# Patient Record
(demographics unavailable — no encounter records)

---

## 2025-03-04 NOTE — PHYSICAL EXAM
[No Edema] : there was no peripheral edema [Normal] : no rash [Coordination Grossly Intact] : coordination grossly intact [No Focal Deficits] : no focal deficits [Normal Gait] : normal gait [Normal Affect] : the affect was normal [Normal Insight/Judgement] : insight and judgment were intact [de-identified] : has superficial abrasions on the forearms from his puppies.

## 2025-03-04 NOTE — HEALTH RISK ASSESSMENT
[No] : In the past 12 months have you used drugs other than those required for medical reasons? No [No falls in past year] : Patient reported no falls in the past year [0] : 2) Feeling down, depressed, or hopeless: Not at all (0) [PHQ-2 Negative - No further assessment needed] : PHQ-2 Negative - No further assessment needed [Former] : Former [20 or more] : 20 or more [< 15 Years] : < 15 Years [HIV test declined] : HIV test declined [Hepatitis C test declined] : Hepatitis C test declined [None] : None [With Significant Other] : lives with significant other [Employed] : employed [Significant Other] : lives with significant other [Feels Safe at Home] : Feels safe at home [Fully functional (bathing, dressing, toileting, transferring, walking, feeding)] : Fully functional (bathing, dressing, toileting, transferring, walking, feeding) [Fully functional (using the telephone, shopping, preparing meals, housekeeping, doing laundry, using] : Fully functional and needs no help or supervision to perform IADLs (using the telephone, shopping, preparing meals, housekeeping, doing laundry, using transportation, managing medications and managing finances) [With Patient/Caregiver] : , with patient/caregiver [Reviewed no changes] : Reviewed, no changes [I will adhere to the patient's wishes.] : I will adhere to the patient's wishes. [de-identified] : sober x5 years [Audit-CScore] : 0 [de-identified] : has increased activity recently with new puppies [de-identified] : room for improvement; admits to carbs/sugar [[id number]] : 0 [EyeExamDate] : 2024 [Patient declined colonoscopy] : Patient declined colonoscopy [Change in mental status noted] : No change in mental status noted [Language] : denies difficulty with language [Sexually Active] : not sexually active [High Risk Behavior] : no high risk behavior

## 2025-03-04 NOTE — ASSESSMENT
[FreeTextEntry1] : Patient is a 62yo male with PMH HTN, HLD, prediabetes, overweight who presents to the office for CPE.  Health Maintenance - Due for CRC screening, declines colonoscopy for now but willing to consider Cologuard.  Tasked  to set patient up. - Fasting labs drawn in office. - EKG performed in office NSR, no acute ST/T changes, no arrhythmias.  Pt denies cardiac complaints.  Pt has strong family history of heart disease and has several heart disease risk factors.  Has been recommended to see Cardiology for baseline testing in the past but has not followed through.  Referral placed. - Eat plenty of fruits and vegetables, especially deeply colored fruits/vegetables (such as leafy greens, peaches) that are more nutrient-dense.  Continue to work hard on diet and exercise, limiting/avoiding saturated fat, fatty foods, greasy foods, red meats, white flour-based carbohydrates (cookies, cakes, white bread, white rice), and added sugars.  Chose whole grain foods and products made with whole grains over refined grains and white flour-based carbohydrates.  Avoid beverages and food with added sugar.  Limit salt intake to improve blood pressure.  Limit alcohol intake. - Try and incorporate a minimum of 150 minutes of exercise per week of moderate activity.  You should also try to incorporate ~20 minutes of weight training to your regimen at least 2-3 times per week.  HTN - BP at goal today in office. - Continue Amlodipine 10mg daily. - Monitor blood pressure at home, keep log, alert office if too high/too low. - DASH diet encouraged, regular exercise encouraged. - Alert office if you develop any new, worsening or concerning symptoms including headache, vision changes, dizziness, loss of consciousness, chest pain, shortness of breath, or lower extremity edema.  HLD - Fasting labs drawn in office. - Continue Atorvastatin 10mg daily. - Limit/avoid greasy foods, fried foods, fatty foods, and saturated fat in your diet and try and eat more lean proteins.  Anxiety - Feels well/stable on current medication regimen. - Continue Sertraline 100mg daily. - Alert office if symptoms worsen.  Obesity - Has had weight gain after not having Wegovy x2 months. - Pt to continue Wegovy 10mg weekly for now. - Would be interested in switching to Ozempic (if diabetic), or Zepbound. - F/u 3-6 months for weight check/rpt fasting labs.  Call the office or go to the ED immediately if you develop new, worsening or concerning symptoms including high fever, severe headache/worst headache of your life, confusion, dizziness/lightheadedness, loss of consciousness, severe chest pain, difficulty breathing, shortness of breath, severe abdominal pain, excessive vomiting/diarrhea, inability to feel/move the extremities, or any other concerning symptoms.

## 2025-03-04 NOTE — HISTORY OF PRESENT ILLNESS
[FreeTextEntry1] : CPE. [de-identified] : Patient is a 62yo male with PMH HTN, HLD, prediabetes, overweight who presents to the office for CPE.    Last CPE:  01/19/2024, Dr. Bowen.  Colonoscopy:  due, recommended today; declines but willing to try Cologuard; no known family history of colon cancer.  CT Lung Cancer Screen:  due, will place order. Ophthalmology:  2024. Dermatology:  [facility]. Dentist:  [facility]. PSA:  0.52 in 01/2024.  Testicular self-exams:  no reported abnormalities.  Regular testicular self-exams encouraged.  Shingles:  due, recommended today.  Pt declines for now but will consider.  Flu:  declines. Tdap:  10/2021.  COVID:  received. STOP BANG intermediate risk -- pt declines screening.  Pt was on Wegovy with weight loss.  Did not have medication x2 months and gained all the weight back.  Now back on x3 weeks and feeling better.  Pt states he does still have food noise.  Would possibly be interested in switching medications to Zepbound (or Ozempic if diabetic, which he has been in the past).